# Patient Record
Sex: MALE | Race: WHITE | NOT HISPANIC OR LATINO | URBAN - METROPOLITAN AREA
[De-identification: names, ages, dates, MRNs, and addresses within clinical notes are randomized per-mention and may not be internally consistent; named-entity substitution may affect disease eponyms.]

---

## 2018-08-20 ENCOUNTER — HISTORICAL (OUTPATIENT)
Dept: ADMINISTRATIVE | Facility: HOSPITAL | Age: 38
End: 2018-08-20

## 2018-09-12 ENCOUNTER — HISTORICAL (OUTPATIENT)
Dept: RADIOLOGY | Facility: HOSPITAL | Age: 38
End: 2018-09-12

## 2022-04-09 ENCOUNTER — HISTORICAL (OUTPATIENT)
Dept: ADMINISTRATIVE | Facility: HOSPITAL | Age: 42
End: 2022-04-09
Payer: COMMERCIAL

## 2022-04-26 VITALS
WEIGHT: 176.13 LBS | HEIGHT: 70 IN | BODY MASS INDEX: 25.21 KG/M2 | SYSTOLIC BLOOD PRESSURE: 179 MMHG | DIASTOLIC BLOOD PRESSURE: 78 MMHG

## 2024-09-03 ENCOUNTER — HOSPITAL ENCOUNTER (EMERGENCY)
Facility: HOSPITAL | Age: 44
Discharge: HOME | End: 2024-09-03
Attending: EMERGENCY MEDICINE
Payer: COMMERCIAL

## 2024-09-03 VITALS
HEIGHT: 70 IN | RESPIRATION RATE: 19 BRPM | DIASTOLIC BLOOD PRESSURE: 72 MMHG | BODY MASS INDEX: 25.05 KG/M2 | OXYGEN SATURATION: 96 % | HEART RATE: 60 BPM | TEMPERATURE: 98 F | WEIGHT: 175 LBS | SYSTOLIC BLOOD PRESSURE: 124 MMHG

## 2024-09-03 DIAGNOSIS — R56.9 SEIZURE: Primary | ICD-10-CM

## 2024-09-03 PROCEDURE — 99281 EMR DPT VST MAYX REQ PHY/QHP: CPT
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
## 2024-09-03 NOTE — DISCHARGE INSTRUCTIONS
MRI brain ordered outpatient for you. Contact [facility] scheduling to get MRI brain scheduled and done prior to appointment. If you have another seizure, return to ER

## 2024-09-03 NOTE — FIRST PROVIDER EVALUATION
Medical screening examination initiated.  I have conducted a focused provider triage encounter, findings are as follows:    Brief history of present illness:  44 y/o male who presents with having seizure yesterday on way home from [place]. States family witnessed it and then brought him to local [facility] where he had labs and CT and all was normal. He was told to follow up with neurology. He went to [facility] last night where he was discharged and told to follow up with neurology. He feels fine today but trying to see neurology.     There were no vitals filed for this visit.    Pertinent physical exam:  alert, nonlabored, ambulatory     Brief workup plan:  exam    Preliminary workup initiated; this workup will be continued and followed by the physician or advanced practice provider that is assigned to the patient when roomed.

## 2024-09-04 ENCOUNTER — OFFICE VISIT (OUTPATIENT)
Dept: NEUROLOGY | Facility: CLINIC | Age: 44
End: 2024-09-04
Payer: COMMERCIAL

## 2024-09-04 VITALS
HEIGHT: 70 IN | DIASTOLIC BLOOD PRESSURE: 92 MMHG | SYSTOLIC BLOOD PRESSURE: 132 MMHG | BODY MASS INDEX: 25.05 KG/M2 | WEIGHT: 175 LBS

## 2024-09-04 DIAGNOSIS — R56.9 SEIZURE: Primary | ICD-10-CM

## 2024-09-04 PROCEDURE — 3080F DIAST BP >= 90 MM HG: CPT | Mod: CPTII,S$GLB,, | Performed by: SPECIALIST

## 2024-09-04 PROCEDURE — 3008F BODY MASS INDEX DOCD: CPT | Mod: CPTII,S$GLB,, | Performed by: SPECIALIST

## 2024-09-04 PROCEDURE — 4010F ACE/ARB THERAPY RXD/TAKEN: CPT | Mod: CPTII,S$GLB,, | Performed by: SPECIALIST

## 2024-09-04 PROCEDURE — 1160F RVW MEDS BY RX/DR IN RCRD: CPT | Mod: CPTII,S$GLB,, | Performed by: SPECIALIST

## 2024-09-04 PROCEDURE — 1159F MED LIST DOCD IN RCRD: CPT | Mod: CPTII,S$GLB,, | Performed by: SPECIALIST

## 2024-09-04 PROCEDURE — 99204 OFFICE O/P NEW MOD 45 MIN: CPT | Mod: S$GLB,,, | Performed by: SPECIALIST

## 2024-09-04 PROCEDURE — 3075F SYST BP GE 130 - 139MM HG: CPT | Mod: CPTII,S$GLB,, | Performed by: SPECIALIST

## 2024-09-04 PROCEDURE — 99999 PR PBB SHADOW E&M-EST. PATIENT-LVL III: CPT | Mod: PBBFAC,,, | Performed by: SPECIALIST

## 2024-09-04 RX ORDER — METOCLOPRAMIDE 5 MG/1
5 TABLET ORAL
COMMUNITY
Start: 2024-08-13

## 2024-09-04 RX ORDER — LOSARTAN POTASSIUM 50 MG/1
50 TABLET ORAL EVERY MORNING
COMMUNITY

## 2024-09-04 RX ORDER — PROPRANOLOL HYDROCHLORIDE 20 MG/1
20 TABLET ORAL DAILY PRN
COMMUNITY
Start: 2024-04-11

## 2024-09-04 RX ORDER — DESVENLAFAXINE 100 MG/1
100 TABLET, EXTENDED RELEASE ORAL DAILY
COMMUNITY
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
## 2024-09-17 ENCOUNTER — PROCEDURE VISIT (OUTPATIENT)
Dept: NEUROLOGY | Facility: HOSPITAL | Age: 44
End: 2024-09-17
Payer: COMMERCIAL

## 2024-09-17 DIAGNOSIS — R56.9 SEIZURE: ICD-10-CM

## 2024-09-17 DIAGNOSIS — T43.295A: Primary | ICD-10-CM

## 2024-09-17 PROCEDURE — 95816 EEG AWAKE AND DROWSY: CPT | Mod: 26,,, | Performed by: SPECIALIST

## 2024-09-17 PROCEDURE — 95819 EEG AWAKE AND ASLEEP: CPT

## 2024-09-23 NOTE — PROCEDURES
EEG REPORT    PATIENT: Hung Norton  : 1980  Milagros Meneses, FNP  [address] [place] [address] [place] [address]   Encounter Diagnosis   Name Primary?    Seizure        INTERPRETING PHYSICIAN: Lm Rowe     Reason for EEG: Patient reported that he had seizure episode last week.   No previous history of seizure. Had been on Wellbutrin at time of seizure.      Digital EEG reviewed.  Electrodes placed in customary 10-20 fashion.  Video recorded:  yes   Duration of recording:     __32_minutes   Patient    Awake    Asleep.  The resting posterior background consists of symmetrical alpha frequencies.  Lateralizing, focal, or epileptiform features were not present.  Activations:   HV photic performed and not associated with abnormalities.    Technical character: Good    EKG: NSR     IMPRESSION: This is a normal routine EEG.     Comments:   The lack of focal or epileptiform features does not negate or exclude a diagnosis of seizure or epilepsy, as the sensitivity of interictal EEG may be < or equal to 50%.    MD MARIALUISA VásquezA

## 2024-09-30 ENCOUNTER — TELEPHONE (OUTPATIENT)
Dept: NEUROLOGY | Facility: CLINIC | Age: 44
End: 2024-09-30
Payer: COMMERCIAL

## 2024-09-30 NOTE — TELEPHONE ENCOUNTER
----- Message from Nurse Kalli sent at 2024  8:05 AM CDT -----  Regarding: NELIDA CLINICAL MESSAGE  u 26-Sep-24 11:50a TAKEN     To:          Office  From:        Hung Norton  Phone:      [phone]  Patient:     Same  :        [birth year]  RegDr:      Dr Lm Rowe  Ref:         needs his results of his EEG     Subject:         Patient Calls  ClrID:   [phone]

## 2024-09-30 NOTE — TELEPHONE ENCOUNTER
Patient informed  EEG dated 09/17 was noted to be normal on results. Pt will discuss further at upcoming appt in October if he acquires further questions.     Pt voiced understanding and appreciation

## 2024-10-22 ENCOUNTER — HOSPITAL ENCOUNTER (OUTPATIENT)
Dept: RADIOLOGY | Facility: HOSPITAL | Age: 44
Discharge: HOME | End: 2024-10-22
Payer: COMMERCIAL

## 2024-10-22 DIAGNOSIS — R56.9 SEIZURE: ICD-10-CM

## 2024-10-22 PROCEDURE — 70553 MRI BRAIN STEM W/O & W/DYE: CPT | Mod: TC

## 2024-10-22 PROCEDURE — 25500020 PHARM REV CODE 255

## 2024-10-22 PROCEDURE — A9577 INJ MULTIHANCE: HCPCS

## 2024-10-22 RX ADMIN — GADOBENATE DIMEGLUMINE 15 ML: 529 INJECTION, SOLUTION INTRAVENOUS at 09:10

## 2024-10-29 ENCOUNTER — OFFICE VISIT (OUTPATIENT)
Dept: NEUROLOGY | Facility: CLINIC | Age: 44
End: 2024-10-29
Payer: COMMERCIAL

## 2024-10-29 DIAGNOSIS — R56.9 SINGLE SEIZURE: Primary | ICD-10-CM

## 2024-10-29 PROCEDURE — 99214 OFFICE O/P EST MOD 30 MIN: CPT | Mod: 95,,, | Performed by: SPECIALIST
